# Patient Record
Sex: FEMALE | Race: WHITE | NOT HISPANIC OR LATINO | Employment: FULL TIME | URBAN - METROPOLITAN AREA
[De-identification: names, ages, dates, MRNs, and addresses within clinical notes are randomized per-mention and may not be internally consistent; named-entity substitution may affect disease eponyms.]

---

## 2021-09-14 ENCOUNTER — OFFICE VISIT (OUTPATIENT)
Dept: URGENT CARE | Facility: CLINIC | Age: 26
End: 2021-09-14
Payer: COMMERCIAL

## 2021-09-14 ENCOUNTER — APPOINTMENT (OUTPATIENT)
Dept: RADIOLOGY | Facility: IMAGING CENTER | Age: 26
End: 2021-09-14
Attending: PHYSICIAN ASSISTANT
Payer: COMMERCIAL

## 2021-09-14 VITALS
BODY MASS INDEX: 34.42 KG/M2 | SYSTOLIC BLOOD PRESSURE: 106 MMHG | DIASTOLIC BLOOD PRESSURE: 60 MMHG | RESPIRATION RATE: 16 BRPM | TEMPERATURE: 98.1 F | WEIGHT: 232.4 LBS | HEIGHT: 69 IN | HEART RATE: 85 BPM | OXYGEN SATURATION: 98 %

## 2021-09-14 DIAGNOSIS — S99.911A INJURY OF RIGHT ANKLE, INITIAL ENCOUNTER: ICD-10-CM

## 2021-09-14 DIAGNOSIS — M89.9 LESION OF BONE OF RIGHT LOWER LEG: ICD-10-CM

## 2021-09-14 PROCEDURE — 73610 X-RAY EXAM OF ANKLE: CPT | Mod: TC,RT | Performed by: PHYSICIAN ASSISTANT

## 2021-09-14 PROCEDURE — 99204 OFFICE O/P NEW MOD 45 MIN: CPT | Performed by: PHYSICIAN ASSISTANT

## 2021-09-14 RX ORDER — LEVETIRACETAM 500 MG/1
500 TABLET ORAL 2 TIMES DAILY
COMMUNITY

## 2021-09-14 NOTE — LETTER
September 14, 2021         Patient: Liliana Jimenez   YOB: 1995   Date of Visit: 9/14/2021           To Whom it May Concern:    Liliana Jimenez was seen in my clinic on 9/14/2021. Please excuse any absences this week due to ankle injury.    If you have any questions or concerns, please don't hesitate to call.        Sincerely,           Hussein Hobbs P.A.-C.  Electronically Signed

## 2021-09-15 ENCOUNTER — TELEPHONE (OUTPATIENT)
Dept: URGENT CARE | Facility: CLINIC | Age: 26
End: 2021-09-15

## 2021-09-15 NOTE — PROGRESS NOTES
"Subjective     Liliana Jimenez is a 25 y.o. female who presents with Ankle Injury (fell and twisted ankle ( R ) x 3 days )            Ankle Injury   The incident occurred 3 to 5 days ago. The incident occurred at home. The injury mechanism was an inversion injury. The pain is present in the right ankle. Pertinent negatives include no loss of motion, loss of sensation, muscle weakness, numbness or tingling. The symptoms are aggravated by movement, palpation and weight bearing. She has tried ice for the symptoms. The treatment provided mild relief.       PMH:  has no past medical history on file.  MEDS:   Current Outpatient Medications:   •  levETIRAcetam (KEPPRA) 500 MG Tab, Take 500 mg by mouth 2 times a day., Disp: , Rfl:   ALLERGIES: No Known Allergies  SURGHX: History reviewed. No pertinent surgical history.  SOCHX:    FH: family history is not on file.    Review of Systems   Constitutional: Negative.    Respiratory: Negative.    Cardiovascular: Negative.    Musculoskeletal: Positive for joint pain. Negative for falls.   Neurological: Negative.  Negative for tingling and numbness.   All other systems reviewed and are negative.      Medications, Allergies, and current problem list reviewed today in Epic           Objective     /60   Pulse 85   Temp 36.7 °C (98.1 °F) (Temporal)   Resp 16   Ht 1.753 m (5' 9\")   Wt 105 kg (232 lb 6.4 oz)   SpO2 98%   BMI 34.32 kg/m²      Physical Exam  Vitals and nursing note reviewed.   Constitutional:       General: She is not in acute distress.     Appearance: She is well-developed. She is not diaphoretic.   HENT:      Head: Normocephalic and atraumatic.   Eyes:      Conjunctiva/sclera: Conjunctivae normal.   Cardiovascular:      Rate and Rhythm: Normal rate and regular rhythm.      Heart sounds: Normal heart sounds.   Pulmonary:      Effort: Pulmonary effort is normal. No respiratory distress.      Breath sounds: Normal breath sounds. No wheezing.   Musculoskeletal: "      Cervical back: Normal range of motion and neck supple.      Right ankle: Swelling present. Tenderness present over the lateral malleolus and ATF ligament. No base of 5th metatarsal or proximal fibula tenderness. Decreased range of motion.      Right Achilles Tendon: Normal.        Feet:    Skin:     General: Skin is warm and dry.   Neurological:      Mental Status: She is alert and oriented to person, place, and time.   Psychiatric:         Behavior: Behavior normal.         Thought Content: Thought content normal.         Judgment: Judgment normal.                     Assessment & Plan     9/14/2021 10:41 PM     HISTORY/REASON FOR EXAM: Pain/Deformity Following Trauma     TECHNIQUE/EXAM DESCRIPTION:  AP, lateral, and oblique views of the 3 ankle.     COMPARISON:  None     FINDINGS:     There is no visualized fracture, subluxation, or dislocation. Lobulated cortically based sclerotic lesion is seen in the distal right tibial diaphysis. Soft tissue swelling of the lateral ankle is seen.     IMPRESSION:        1.  No acute traumatic bony injury.  2.  Lobulated cortically based sclerotic lesion in the distal right tibial diaphysis. Follow-up with contrast-enhanced MRI for further characterization recommended.     1. Injury of right ankle, initial encounter  DX-ANKLE 3+ VIEWS RIGHT   2. Lesion of bone of right lower leg  FX-[id number]-WITH & W/O RIGHT    REFERRAL TO ORTHOPEDICS     Ankle sprain right ankle.  X-ray negative and symptoms improving.  On the x-ray there was an incidental finding concerning for a sclerotic bone lesion.  A MRI was ordered to be completed emergently.  However due to patient's insurance she cannot obtain this test at [facility].  She was given all the proper information to schedule with [facility].  I will refer to proper channels pending MRI results.    Update: 9/26/2021 10:30 PM  Have left 3 messages personally and my assistant has left multiple voicemails.  Unable to reach  patient to determine if she has scheduled and completed ordered MRI.  Due to this uncertainty I have placed a referral to orthopedics in the hopes that she can have continuity of care.    Return to clinic or go to ED if symptoms worsen or persist. Indications for ED discussed at length. Patient/Parent/Guardian voices understanding. Follow-up with your primary care provider in 3-5 days. Red flag symptoms discussed. All side effects of medication discussed including allergic response, GI upset, tendon injury, rash, sedation etc.    Please note that this dictation was created using voice recognition software. I have made every reasonable attempt to correct obvious errors, but I expect that there are errors of grammar and possibly content that I did not discover before finalizing the note.

## 2021-09-15 NOTE — TELEPHONE ENCOUNTER
Dillon Savage, I tried calling in the MR- TIBIA- FIBULA- WITH & W/O RIGHT, but unfortunately I was not able to because of the patient's insurance. Insurance will not cover it.

## 2021-09-26 ASSESSMENT — ENCOUNTER SYMPTOMS
LOSS OF SENSATION: 0
MUSCLE WEAKNESS: 0
NEUROLOGICAL NEGATIVE: 1
FALLS: 0
NUMBNESS: 0
TINGLING: 0
RESPIRATORY NEGATIVE: 1
CONSTITUTIONAL NEGATIVE: 1
LOSS OF MOTION: 0
CARDIOVASCULAR NEGATIVE: 1

## 2021-10-24 ENCOUNTER — HOSPITAL ENCOUNTER (EMERGENCY)
Facility: MEDICAL CENTER | Age: 26
End: 2021-10-25
Attending: EMERGENCY MEDICINE | Admitting: EMERGENCY MEDICINE
Payer: COMMERCIAL

## 2021-10-24 ENCOUNTER — APPOINTMENT (OUTPATIENT)
Dept: RADIOLOGY | Facility: MEDICAL CENTER | Age: 26
End: 2021-10-24
Attending: EMERGENCY MEDICINE
Payer: COMMERCIAL

## 2021-10-24 ENCOUNTER — OCCUPATIONAL MEDICINE (OUTPATIENT)
Dept: URGENT CARE | Facility: CLINIC | Age: 26
End: 2021-10-24
Payer: COMMERCIAL

## 2021-10-24 VITALS
DIASTOLIC BLOOD PRESSURE: 78 MMHG | SYSTOLIC BLOOD PRESSURE: 110 MMHG | BODY MASS INDEX: 33.47 KG/M2 | WEIGHT: 226 LBS | RESPIRATION RATE: 14 BRPM | HEIGHT: 69 IN | HEART RATE: 53 BPM | OXYGEN SATURATION: 99 % | TEMPERATURE: 97.4 F

## 2021-10-24 DIAGNOSIS — S60.221A CONTUSION OF RIGHT HAND, INITIAL ENCOUNTER: ICD-10-CM

## 2021-10-24 DIAGNOSIS — S67.21XA CRUSHING INJURY OF RIGHT HAND, INITIAL ENCOUNTER: ICD-10-CM

## 2021-10-24 PROCEDURE — 73130 X-RAY EXAM OF HAND: CPT | Mod: RT

## 2021-10-24 PROCEDURE — 99213 OFFICE O/P EST LOW 20 MIN: CPT | Performed by: FAMILY MEDICINE

## 2021-10-24 PROCEDURE — 99283 EMERGENCY DEPT VISIT LOW MDM: CPT

## 2021-10-24 ASSESSMENT — PAIN DESCRIPTION - DESCRIPTORS: DESCRIPTORS: ACHING

## 2021-10-24 NOTE — LETTER
[facility], EMERGENCY DEPT   [address] [place] [address]  Phone: Dept: [phone] - Fax:        [facility] Progress Report and Disability Certification  Date of Service: 10/24/2021   No Show:  No  Date / Time of Next Visit: 11/1/2021   Claim Information   Patient Name: Liliana Jimenez  Claim Number:  [id number]   Employer: [employer]  Date of Injury: 10/18/2021     Insurer / TPA: [employer] ID / SSN:    Occupation:  Diagnosis: The encounter diagnosis was Contusion of right hand, initial encounter.    Medical Information   Related to Industrial Injury? Yes    Subjective Complaints:  Right hand swelling and pain   Objective Findings: Contusion, his second third and fourth distal metacarpals   Pre-Existing Condition(s): none   Assessment:   Condition Same    Status: Additional Care Required  Permanent Disability:  Comments:unlikely    Plan: Medication    Diagnostics: X-ray    Comments:       Disability Information   Status: Released to Restricted Duty    From:  10/25/2021  Through: 11/1/2021 Restrictions are: Temporary   Physical Restrictions   Sitting:    Standing:    Stooping:    Bending:      Squatting:    Walking:    Climbing:    Pushing:      Pulling:    Other:    Reaching Above Shoulder (L):   Reaching Above Shoulder (R):       Reaching Below Shoulder (L):    Reaching Below Shoulder (R):      Not to exceed Weight Limits   Carrying(hrs):   Weight Limit(lb):   Lifting(hrs):   Weight  Limit(lb):     Comments: Limited use of right hand no lifting, pushing, pulling greater than 5 pounds.  Computer work/typing is okay     Repetitive Actions   Hands: i.e. Fine Manipulations from Grasping:     Feet: i.e. Operating Foot Controls:     Driving / Operate Machinery:     Physician Name: Patrick Kwan Physician Signature: PATRICK Garcia M.D. e-Signature:  , Medical Director   Clinic Name / Location: [facility]  [facility]  [facility], EMERGENCY DEPT  [address]  [place] [address]  [phone]     Clinic Phone Number: Dept: [phone]   Appointment Time:  Visit Start Time:    Check-In Time:  10:34 PM Visit Discharge Time:    Original-Treating Physician or Chiropractor    Page 2-Insurer/TPA    Page 3-Employer    Page 4-Employee

## 2021-10-24 NOTE — LETTER
[facility] [place] [address] [facility] [address] [place] [address]  Phone:  [phone] - Fax:  [phone]   [facility] Progress Report and Disability Certification  Date of Service: 10/24/2021   No Show:  No  Date / Time of Next Visit:     Claim Information   Patient Name: Liliana Jimenez  Claim Number:     Employer: [employer]  Date of Injury: 10/18/2021     Insurer / TPA: [employer]  ID / SSN:     Occupation:   Diagnosis: The encounter diagnosis was Crushing injury of right hand, initial encounter.    Medical Information   Related to Industrial Injury? Yes    Subjective Complaints:  DOI: 10/18    Rt hand smashed between battery pack and cart at work on 10/18.   She reports pain and swelling are not improving.        She continues to c/o  constant,   but throbbing rt hand pain, worse when she tries to use the hand.       The pain does not radiate.   Pertinent negatives include no chest pain, muscle weakness, numbness or tingling. The symptoms are aggravated by movement and palpation. Pt has tried motrin for the symptoms.      Objective Findings:      Rt hand :        Left hand: Normal sensation noted. Normal strength noted.  + TTP over 3rd/4th MCP.  normal range of motion and no crepitus.   Cap refill < 2 s.    strength 4/5   Pre-Existing Condition(s):     Assessment:   Initial Visit    Status: Discharged / Care Transfer  Permanent Disability:     Plan:      Diagnostics:      Comments:       Disability Information   Status:      From:     Through:   Restrictions are:   Comments:pending, per emergency room provider   Physical Restrictions   Sitting:    Standing:    Stooping:    Bending:      Squatting:    Walking:    Climbing:    Pushing:      Pulling:    Other:    Reaching Above Shoulder (L):   Reaching Above Shoulder (R):       Reaching Below Shoulder (L):    Reaching Below Shoulder (R):      Not to exceed Weight Limits   Carrying(hrs):   Weight  Limit(lb):   Lifting(hrs):   Weight  Limit(lb):     Comments:   1. Crushing injury of right hand, initial encounter      Concern for fracture, and I do not have access to x-ray at this hour of the night.       Will transfer to [facility] ED for further evaluation and treatment.       Work restrictions pending per ERP         Repetitive Actions   Hands: i.e. Fine Manipulations from Grasping:     Feet: i.e. Operating Foot Controls:     Driving / Operate Machinery:     Health Care Provider’s Original or Electronic Signature  Jesse Bennett M.D. Health Care Provider’s Original or Electronic Signature    Garrison Hall MD         Clinic Name / Location: [address] [place] [address] Clinic Phone Number: Dept: [phone]   Appointment Time: 8:30 Pm Visit Start Time: 9:07 PM   Check-In Time:  8:32 Pm Visit Discharge Time: 10:13pm   Original-Treating Physician or Chiropractor    Page 2-Insurer/TPA    Page 3-Employer    Page 4-Employee

## 2021-10-24 NOTE — LETTER
[facility], EMERGENCY DEPT   [address] [place] [address]  Phone: Dept: [phone] - Fax:        [facility] Progress Report and Disability Certification  Date of Service: 10/24/2021   No Show:  No  Date / Time of Next Visit: 11/1/2021   Claim Information   Patient Name: Liliana Jimenez  Claim Number:     Employer: [employer]  Date of Injury:      Insurer / TPA: [employer] ID / SSN: [id number]    Occupation:  Diagnosis: The encounter diagnosis was Contusion of right hand, initial encounter.    Medical Information   Related to Industrial Injury? Yes ***   Subjective Complaints:  Right hand swelling and pain   Objective Findings: Contusion, his second third and fourth distal metacarpals   Pre-Existing Condition(s): none   Assessment:   Condition Same    Status: Additional Care Required  Permanent Disability:  Comments:unlikely    Plan: Medication    Diagnostics: X-ray    Comments:       Disability Information   Status: Released to Restricted Duty    From:  10/25/2021  Through: 11/1/2021 Restrictions are: Temporary   Physical Restrictions   Sitting:    Standing:    Stooping:    Bending:      Squatting:    Walking:    Climbing:    Pushing:      Pulling:    Other:    Reaching Above Shoulder (L):   Reaching Above Shoulder (R):       Reaching Below Shoulder (L):    Reaching Below Shoulder (R):      Not to exceed Weight Limits   Carrying(hrs):   Weight Limit(lb):   Lifting(hrs):   Weight  Limit(lb):     Comments: Limited use of right hand no lifting, pushing, pulling greater than 5 pounds.  Computer work/typing is okay     Repetitive Actions   Hands: i.e. Fine Manipulations from Grasping:     Feet: i.e. Operating Foot Controls:     Driving / Operate Machinery:     Physician Name: Patrick Kwan Physician Signature: PATRICK Garcia M.D. e-Signature:  , Medical Director   Clinic Name / Location: [facility]  [facility], EMERGENCY DEPT  [address]  [place] [address]  [phone]     Clinic Phone Number: Dept: [phone]   Appointment Time:  Visit Start Time:    Check-In Time:  10:34 PM Visit Discharge Time:    Original-Treating Physician or Chiropractor    Page 2-Insurer/TPA    Page 3-Employer    Page 4-Employee

## 2021-10-24 NOTE — LETTER
"EMPLOYEE’S CLAIM FOR COMPENSATION/ REPORT OF INITIAL TREATMENT  FORM C-4    EMPLOYEE’S CLAIM - PROVIDE ALL INFORMATION REQUESTED   First Name  Liliana Last Name  Tony Birthdate                    1995                Sex  female Claim Number (Insurer’s Use Only)    Home Address  [address]  [address] Age  25 y.o. Height  1.753 m (5' 9\") Weight  103 kg (226 lb) [facility]     [facility] Zip  [address] Telephone  [phone] (home)    Mailing Address  [address]  [address] [facility] Zip  [address] Primary Language Spoken  English    Insurer   Third-Party   [employer]   Employee's Occupation (Job Title) When Injury or Occupational Disease Occurred      Employer's Name/Company Name  [employer]  Telephone  [phone]    Office Mail Address (Number and Street)   [address]  [place]  Zip  [address]    Date of Injury  10/18/2021               Hours Injury  8:00 PM Date Employer Notified  10/18/2021 Last Day of Work after Injury     or Occupational Disease  10/20/2021 Supervisor to Whom Injury     Reported  Markie Sullivan   Address or Location of Accident (if applicable)  [[address]   What were you doing at the time of accident? (if applicable)  Working    How did this injury or occupational disease occur? (Be specific an answer in detail. Use additional sheet if necessary)  Training in a specific area with a new person. We were pushing a battery pack onto a conveyor. Told the new karmen to wait so I could remove red stopper. New karmen didn't hear me & continued pushing pack onto my hand   If you believe that you have an occupational disease, when did you first have knowledge of the disability and it relationship to your employment?  N/A Witnesses to the Accident  N/A- I don't remember the new demond name      Nature of Injury or Occupational " Disease  Crushing  Part(s) of Body Injured or Affected  Hand (R), ,     I certify that the above is true and correct to the best of my knowledge and that I have provided this information in order to obtain the benefits of [place] of [place]).  I hereby authorize any physician, chiropractor, surgeon, practitioner, or other person, any hospital, including [facility] or [facility] hospital, any medical service organization, any insurance company, or other institution or organization to release to each other, any medical or other information, including benefits paid or payable, pertinent to this injury or disease, except information relative to diagnosis, treatment and/or counseling for AIDS, psychological conditions, alcohol or controlled substances, for which I must give specific authorization.  A Photostat of this authorization shall be as valid as the original.     Date   Place Employee’s Original or  *Electronic Signature   THIS REPORT MUST BE COMPLETED AND MAILED WITHIN 3 WORKING DAYS OF TREATMENT   Place  [facility]  Name of Facility  [facility]   Date  10/24/2021 Diagnosis and Description of Injury or Occupational Disease  (S67.21XA) Crushing injury of right hand, initial encounter Is there evidence the injured employee was under the influence of alcohol and/or another controlled substance at the time of accident?  ? No ? Yes (if yes, please explain)    Hour  9:07 PM   The encounter diagnosis was Crushing injury of right hand, initial encounter.     Treatment    1. Crushing injury of right hand, initial encounter      Concern for fracture, and I do not have access to x-ray at this hour of the night.       Will transfer to [facility] ED for further evaluation and treatment.       Work restrictions pending per ERP       Have you advised the patient to remain off work five days or     more?  "   X-Ray Findings      ? Yes Indicate dates:   From   To      From information given by the employee, together with medical evidence, can        you directly connect this injury or occupational disease as job incurred?  Yes ? No If no, is the injured employee capable of:  ? full duty  No ? modified duty  Yes   Is additional medical care by a physician indicated?  Yes If Modified Duty, Specify any Limitations / Restrictions  Work restrictions pending per ERP     Do you know of any previous injury or disease contributing to this condition or occupational disease?  ? Yes ? No (Explain if yes)                          No   Date  10/24/2021 Print Health Care Provider's   Jesse Bennett M.D. I certify the employer’s copy of  this form was mailed on:   Address  [address]     [place] Zip  [address]    Provider’s Tax ID Number  [id number] Telephone  Dept: [phone]             Health Care Provider’s Original or Electronic Signature  e-JESSE Brady M.D. Degree (MD,DO, DC,PA-C,APRN)         * Complete and attach Release of Information (Form C-4A) when injured employee signs C-4 Form electronically  ORIGINAL - TREATING HEALTHCARE PROVIDER PAGE 2 - INSURER/TPA PAGE 3 - EMPLOYER PAGE 4 - EMPLOYEE             Form C-4 (rev.08/21)           BRIEF DESCRIPTION OF RIGHTS AND BENEFITS  (Pursuant to [place] 616C.050)    Notice of Injury or Occupational Disease (Incident Report Form C-1): If an injury or occupational disease (OD) arises out of and in the course of employment, you must provide written notice to your employer as soon as practicable, but no later than 7 days after the accident or OD. Your employer shall maintain a sufficient supply of the required forms.    Claim for Compensation (Form C-4): If medical treatment is sought, the form C-4 is available at the place of initial treatment. A completed \"Claim for Compensation\" (Form C-4) must be filed within 90 days after an " accident or OD. The treating physician or chiropractor must, within 3 working days after treatment, complete and mail to the employer, the employer's insurer and third-party , the Claim for Compensation.    Medical Treatment: If you require medical treatment for your on-the-job injury or OD, you may be required to select a physician or chiropractor from a list provided by your workers’ compensation insurer, if it has contracted with an Organization for Managed Care (MCO) or Preferred Provider Organization (PPO) or providers of health care. If your employer has not entered into a contract with an MCO or PPO, you may select a physician or chiropractor from the Panel of Physicians and Chiropractors. Any medical costs related to your industrial injury or OD will be paid by your insurer.    Temporary Total Disability (TTD): If your doctor has certified that you are unable to work for a period of at least 5 consecutive days, or 5 cumulative days in a 20-day period, or places restrictions on you that your employer does not accommodate, you may be entitled to TTD compensation.    Temporary Partial Disability (TPD): If the wage you receive upon reemployment is less than the compensation for TTD to which you are entitled, the insurer may be required to pay you TPD compensation to make up the difference. TPD can only be paid for a maximum of 24 months.    Permanent Partial Disability (PPD): When your medical condition is stable and there is an indication of a PPD as a result of your injury or OD, within 30 days, your insurer must arrange for an evaluation by a rating physician or chiropractor to determine the degree of your PPD. The amount of your PPD award depends on the date of injury, the results of the PPD evaluation, your age and wage.    Permanent Total Disability (PTD): If you are medically certified by a treating physician or chiropractor as permanently and totally disabled and have been granted a PTD  status by your insurer, you are entitled to receive monthly benefits not to exceed 66 2/3% of your average monthly wage. The amount of your PTD payments is subject to reduction if you previously received a lump-sum PPD award.    Vocational Rehabilitation Services: You may be eligible for vocational rehabilitation services if you are unable to return to the job due to a permanent physical impairment or permanent restrictions as a result of your injury or occupational disease.    Transportation and Per Daryn Reimbursement: You may be eligible for travel expenses and per daryn associated with medical treatment.    Reopening: You may be able to reopen your claim if your condition worsens after claim closure.     Appeal Process: If you disagree with a written determination issued by the insurer or the insurer does not respond to your request, you may appeal to the Department of Administration, , by following the instructions contained in your determination letter. You must appeal the determination within 70 days from the date of the determination letter at [address], [address], [place], [place] [address], or [address], [address], [place] [address]. If you disagree with the  decision, you may appeal to the Department of Administration, . You must file your appeal within 30 days from the date of the  decision letter at [address], [address], [place], [place] [address], or [address], [address], [place] [address]. If you disagree with a decision of an , you may file a petition for judicial review with the District Court. You must do so within 30 days of the Appeal Officer’s decision. You may be represented by an  at your own expense or you may contact the [facility] for possible representation.    [place]  for Injured Workers (NAIW): If you disagree with a  decision, you  may request that [facility] represent you without charge at an  Hearing. For information regarding denial of benefits, you may contact the [facility] at: [address], [address], [place] [address], [phone], or [address], [address], [place] [address], [phone]    To File a Complaint with the Division: If you wish to file a complaint with the  of the Division of Industrial Relations (DIR),  please contact the Workers’ Compensation Section, [address], [address], [place], [place] [address], telephone [phone]) [phone], or [address], [address], [place], [place] [address], telephone [phone].    For assistance with Workers’ Compensation Issues: You may contact the [facility] Office for Consumer Health Assistance, [address], [address], [place], [place] [address], Toll Free 1-275.921.1359, Web site: http://[facility].[place].gov/Programs/MEETA E-mail: [email or web address]              __________________________________________________________________                                    _________________            Employee Name / Signature                                                                                                                            Date                                                                                                                                                                                                                              D-2 (rev. 10/20)

## 2021-10-25 VITALS
HEIGHT: 69 IN | BODY MASS INDEX: 33.63 KG/M2 | DIASTOLIC BLOOD PRESSURE: 69 MMHG | SYSTOLIC BLOOD PRESSURE: 117 MMHG | TEMPERATURE: 97.6 F | OXYGEN SATURATION: 99 % | HEART RATE: 56 BPM | WEIGHT: 227.07 LBS | RESPIRATION RATE: 14 BRPM

## 2021-10-25 NOTE — ED PROVIDER NOTES
"ED Provider Note    CHIEF COMPLAINT  Chief Complaint   Patient presents with   • Hand Pain       HPI  Liliana Jimenez is a 25 y.o. female who presents for evaluation of right hand pain after a crush injury at work getting it stuck between a battery and a cart.  Incident was about a week ago and she followed up with urgent care due to continued pain and swelling.  Urgent care was noted around the third and fourth metacarpals distally as well as those same MP joints    REVIEW OF SYSTEMS  Skin: No rashes, abrasions, lacerations, or bruising  Musculoskeletal: No recent trauma, pain, swelling, weakness  Neurologic: No sensory or motor changes.  Heme: No bleeding or bruising problems.   Immuno: No hx of recurrent infections    PAST FAM HISTORY  No family history on file.    PAST MEDICAL HISTORY  seizures    SOCIAL HISTORY  Social History     Tobacco Use   • Smoking status: Never Smoker   • Smokeless tobacco: Never Used   Substance and Sexual Activity   • Alcohol use: Never   • Drug use: Yes     Types: Marijuana   • Sexual activity: Not on file       SURGICAL HISTORY  patient denies any surgical history    CURRENT MEDICATIONS  Home Medications     Reviewed by Lizet Griggs R.N. (Registered Nurse) on 10/24/21 at 2243  Med List Status: Partial   Medication Last Dose Status   levETIRAcetam (KEPPRA) 500 MG Tab  Active                ALLERGIES  No Known Allergies    PHYSICAL EXAM  VITAL SIGNS: /69   Pulse (!) 56   Temp 36.4 °C (97.6 °F) (Temporal)   Resp 14   Ht 1.753 m (5' 9\")   Wt 103 kg (227 lb 1.2 oz)   LMP 10/10/2021 (Exact Date)   SpO2 99%   BMI 33.53 kg/m²    Gen: Alert in no apparent distress.  Cardiovascular: Regular rate and rhythm as measured by the radial artery on affected limb.  2+ radial artery pulse.  Capillary refill less than 3 seconds to affected extremity.    Skin: Warm, Dry, No erythema, No rash or significant ecchymosis noted to affected extremity   Extremities: Intact distal pulses, mild " edema with tenderness to palpation over the distal second, third and fourth metacarpal and MP joints.  There is no tenderness at the wrist or at the snuffbox.   Neurologic: Sensation intact light touch all fingers and thumb on affected extremity.  Patient able to make a fist and extend fingers with decent range of motion and good strength.      RADIOLOGY  DX-HAND 3+ RIGHT   Final Result         1.  No acute traumatic bony injury.            COURSE & MEDICAL DECISION MAKING  Patient was for evaluation of an injury that occurred a week ago at work none does not appear to be associated with any bony abnormalities by plain films.  Functionally, neurologically, and vascularly, the hand appears intact and there is no snuffbox tenderness to argue for further imaging of the wrist.  Patient will follow up with occupational health if symptoms persist and will be placed on light duty for the next week.  She will use a wrist splint of the Velcro type as needed.    FINAL IMPRESSION  1. Contusion of right hand, initial encounter        Electronically signed by: Patrick Kwan M.D., 10/24/2021 10:56 PM

## 2021-10-25 NOTE — ED TRIAGE NOTES
Patient presents with complaints of R hand pain after crushing it at work 1 week ago. Was seen at her onsite clinic for her employer but no Xrays were done; was advised to come into ED should the hand not improved. Some swelling and bruising noted to R hand; able to move fingers. Has been using ibuprofen for the pain with moderate relief.

## 2021-10-25 NOTE — PROGRESS NOTES
"Chief Complaint   Patient presents with   • Work-Related Injury      DOI: 10/18/21 (R) hand, crushed, swelling      Subjective:      Chief Complaint   Patient presents with   • Work-Related Injury      DOI: 10/18/21 (R) hand, crushed, swelling               hand Injury          DOI: 10/18    Rt hand smashed between battery pack and cart at work on 10/18.   She reports pain and swelling are not improving.        She continues to c/o  constant,   but throbbing rt hand pain, worse when she tries to use the hand.       The pain does not radiate.   Pertinent negatives include no chest pain, muscle weakness, numbness or tingling. The symptoms are aggravated by movement and palpation. Pt has tried motrin for the symptoms.     Past medical history was unremarkable and not pertinent to current issue  Social hx - denies tobacco, alcohol, drug use  Family hx was reviewed - no pertinent past family hx      Review of Systems   Constitutional: Negative for fever.   Respiratory: Negative for shortness of breath.    Cardiovascular: Negative for chest pain.   Neurological: Negative for tingling and numbness.   10 point ROS otherwise negative, except per HPI           Objective:     /78   Pulse (!) 53   Temp 36.3 °C (97.4 °F)   Resp 14   Ht 1.753 m (5' 9\")   Wt 103 kg (226 lb)   SpO2 99%       Physical Exam   Constitutional: pt is oriented to person, place, and time. Pt appears well-developed and well-nourished. No distress.   HENT:   Head: Normocephalic and atraumatic.   Eyes: Conjunctivae are normal.   Cardiovascular: Normal rate.    Pulmonary/Chest: Effort normal.   Musculoskeletal:        Rt hand :        Left hand: Normal sensation noted. Normal strength noted.  + TTP over 3rd/4th MCP.  normal range of motion and no crepitus.   Cap refill < 2 s.    strength 4/5    Neurological: pt is alert and oriented to person, place, and time.   Skin: Skin is warm. Pt is not diaphoretic. No erythema.   Nursing note and " vitals reviewed.              Assessment/Plan:         1. Crushing injury of right hand, initial encounter      Concern for fracture, and I do not have access to x-ray at this hour of the night.       Will transfer to [facility] ED for further evaluation and treatment.       Work restrictions pending per ERP      Report called to transfer line.

## 2023-03-27 ENCOUNTER — APPOINTMENT (OUTPATIENT)
Dept: URGENT CARE | Facility: PHYSICIAN GROUP | Age: 28
End: 2023-03-27
Payer: COMMERCIAL

## 2023-03-28 ENCOUNTER — HOSPITAL ENCOUNTER (OUTPATIENT)
Dept: RADIOLOGY | Facility: MEDICAL CENTER | Age: 28
End: 2023-03-28
Attending: PHYSICIAN ASSISTANT
Payer: COMMERCIAL

## 2023-03-28 ENCOUNTER — OFFICE VISIT (OUTPATIENT)
Dept: URGENT CARE | Facility: PHYSICIAN GROUP | Age: 28
End: 2023-03-28
Payer: COMMERCIAL

## 2023-03-28 VITALS
TEMPERATURE: 98.2 F | HEIGHT: 69 IN | WEIGHT: 210 LBS | DIASTOLIC BLOOD PRESSURE: 70 MMHG | HEART RATE: 93 BPM | RESPIRATION RATE: 14 BRPM | OXYGEN SATURATION: 97 % | SYSTOLIC BLOOD PRESSURE: 108 MMHG | BODY MASS INDEX: 31.1 KG/M2

## 2023-03-28 DIAGNOSIS — S89.92XA INJURY OF LEFT KNEE, INITIAL ENCOUNTER: ICD-10-CM

## 2023-03-28 DIAGNOSIS — S80.02XA CONTUSION OF LEFT KNEE, INITIAL ENCOUNTER: ICD-10-CM

## 2023-03-28 PROCEDURE — 99214 OFFICE O/P EST MOD 30 MIN: CPT | Performed by: PHYSICIAN ASSISTANT

## 2023-03-28 PROCEDURE — 73564 X-RAY EXAM KNEE 4 OR MORE: CPT | Mod: LT

## 2023-03-28 ASSESSMENT — ENCOUNTER SYMPTOMS
RESPIRATORY NEGATIVE: 1
NEUROLOGICAL NEGATIVE: 1
CARDIOVASCULAR NEGATIVE: 1
FALLS: 1

## 2023-03-28 ASSESSMENT — FIBROSIS 4 INDEX: FIB4 SCORE: 0.33

## 2023-03-28 NOTE — PROGRESS NOTES
"Subjective     Liliana Jimenez is a very pleasant 27 y.o. female who presents with Fall (Fell on L knee x2 days, slight bruising. Fainted and landed on knee. )            Fall  The accident occurred 2 days ago. The fall occurred while standing. She fell from a height of 3 to 5 ft. She landed on Hard floor. There was no blood loss. The point of impact was the left knee. The pain is present in the left knee. The symptoms are aggravated by ambulation, pressure on injury and use of injured limb. She has tried ice and NSAID for the symptoms. The treatment provided mild relief.       PMH:  has no past medical history on file.  MEDS:   Current Outpatient Medications:     levETIRAcetam (KEPPRA) 500 MG Tab, Take 500 mg by mouth 2 times a day., Disp: , Rfl:   ALLERGIES: No Known Allergies  SURGHX: No past surgical history on file.  SOCHX:  reports that she has never smoked. She has never used smokeless tobacco. She reports current alcohol use. She reports current drug use. Drug: Marijuana.  FH: family history is not on file.    Review of Systems   Respiratory: Negative.     Cardiovascular: Negative.    Musculoskeletal:  Positive for falls and joint pain.   Neurological: Negative.         Medications, Allergies, and current problem list reviewed today in Epic      Objective     /70   Pulse 93   Temp 36.8 °C (98.2 °F)   Resp 14   Ht 1.753 m (5' 9\")   Wt 95.3 kg (210 lb)   SpO2 97%   BMI 31.01 kg/m²      Physical Exam  Vitals and nursing note reviewed.   Constitutional:       General: She is not in acute distress.     Appearance: Normal appearance. She is well-developed. She is not ill-appearing, toxic-appearing or diaphoretic.   HENT:      Head: Normocephalic and atraumatic.      Right Ear: Hearing and external ear normal.      Left Ear: Hearing and external ear normal.      Nose: Nose normal.      Mouth/Throat:      Dentition: Normal dentition. No dental caries.   Eyes:      General:         Right eye: No " discharge.         Left eye: No discharge.      Conjunctiva/sclera: Conjunctivae normal.   Neck:      Thyroid: No thyromegaly.      Trachea: No tracheal deviation.   Cardiovascular:      Rate and Rhythm: Normal rate and regular rhythm.      Heart sounds: Normal heart sounds.   Pulmonary:      Effort: Pulmonary effort is normal. No respiratory distress.      Breath sounds: Normal breath sounds. No wheezing.   Abdominal:      General: Abdomen is flat.      Palpations: Abdomen is soft.   Musculoskeletal:      Cervical back: Normal range of motion and neck supple.      Left knee: Swelling, ecchymosis and bony tenderness present. No crepitus. Decreased range of motion. Normal alignment, normal meniscus and normal patellar mobility.   Skin:     General: Skin is warm and dry.      Nails: There is no clubbing.   Neurological:      General: No focal deficit present.      Mental Status: She is alert and oriented to person, place, and time.   Psychiatric:         Mood and Affect: Mood normal.         Behavior: Behavior normal.         Thought Content: Thought content normal.         Judgment: Judgment normal.                           Assessment & Plan     This is a very pleasant 27-year-old female who fell directly onto her left knee 3 days ago.  She has pain swelling and bruising of the left kneecap.  No previous injuries to this knee.  No numbness tingling or malalignment.  Exam shows TTP with swelling and ecchymosis.  Knee is stable with no crepitus.  X-ray negative.  Patient will be treated for an acute patella contusion.  RICE therapy.  Note for work.    1. Injury of left knee, initial encounter  DX-KNEE COMPLETE 4+ LEFT      2. Contusion of left knee, initial encounter            Return to clinic or go to ED if symptoms worsen or persist. Red flag symptoms and indications for ED discussed at length. Patient/Parent/Guardian voices understanding. Follow-up with your primary care provider in 3-5 days. All side effects and  potential interactions of prescribed medication discussed including allergic response, GI upset, tendon injury, rash, sedation etc.    Please note that this dictation was created using voice recognition software. I have made every reasonable attempt to correct obvious errors, but I expect that there are errors of grammar and possibly content that I did not discover before finalizing the note.

## 2023-03-28 NOTE — LETTER
March 28, 2023         Patient: Liliana Jimenez   YOB: 1995   Date of Visit: 3/28/2023           To Whom it May Concern:    Liliana Jimenez was seen in my clinic on 3/28/2023. She may return to work on Sunday April 2nd.    If you have any questions or concerns, please don't hesitate to call.        Sincerely,           Hussein Hobbs P.A.-C.  Electronically Signed

## 2023-04-11 ENCOUNTER — OFFICE VISIT (OUTPATIENT)
Dept: URGENT CARE | Facility: CLINIC | Age: 28
End: 2023-04-11
Payer: COMMERCIAL

## 2023-04-11 VITALS
SYSTOLIC BLOOD PRESSURE: 110 MMHG | TEMPERATURE: 98.5 F | BODY MASS INDEX: 31.1 KG/M2 | OXYGEN SATURATION: 96 % | RESPIRATION RATE: 16 BRPM | HEIGHT: 69 IN | DIASTOLIC BLOOD PRESSURE: 76 MMHG | HEART RATE: 101 BPM | WEIGHT: 210 LBS

## 2023-04-11 DIAGNOSIS — M25.562 ACUTE PAIN OF LEFT KNEE: ICD-10-CM

## 2023-04-11 DIAGNOSIS — S83.92XA SPRAIN OF LEFT KNEE, UNSPECIFIED LIGAMENT, INITIAL ENCOUNTER: ICD-10-CM

## 2023-04-11 PROCEDURE — 99213 OFFICE O/P EST LOW 20 MIN: CPT | Performed by: PHYSICIAN ASSISTANT

## 2023-04-11 ASSESSMENT — FIBROSIS 4 INDEX: FIB4 SCORE: 0.33

## 2023-04-11 NOTE — LETTER
April 11, 2023         Patient: Liliana Jimenez   YOB: 1995   Date of Visit: 4/11/2023           To Whom it May Concern:    Liliana Jimenez was seen in my clinic on 4/11/2023. Please allow work restrictions that includes not standing or walking more than 4 hours in a day. Work restrictions until cleared by medical professional OR if patient is feeling better.     If you have any questions or concerns, please don't hesitate to call.        Sincerely,           Pierre Morataya P.A.-C.  Electronically Signed

## 2023-04-12 NOTE — PROGRESS NOTES
"Subjective:   Liliana Jimenez is a 27 y.o. female who presents for Knee Injury ((L) KNEE PAIN X 2.5 WEEKS )      HPI  The patient presents to the Urgent Care with complaints of left knee pain.  About 2-1/2 weeks ago she had a ground-level fall and fell to the left side while she was at a [employer]unted [employer] in [facility].  She was seen in the urgent care on 3/28 for this injury and had a negative x-ray.  Since then, her pain has improved gradually but she still is having her left knee diffusely.  Worse with weightbearing, walking, walking up stairs.  Sometimes there is popping to her left knee.  No numbness or tingling.  No injury.        Medications:    levETIRAcetam Tabs    Allergies: Patient has no known allergies.    Problem List: Liliana Jiemnez does not have a problem list on file.    Surgical History:  No past surgical history on file.    Past Social Hx: Liliana Jimenez  reports that she has never smoked. She has never used smokeless tobacco. She reports current alcohol use. She reports current drug use. Drugs: Marijuana and Inhaled.     Past Family Hx:  Liliana Jimenez family history is not on file.     Problem list, medications, and allergies reviewed by myself today in Epic.     Objective:     /76 (BP Location: Right arm, Patient Position: Sitting, BP Cuff Size: Adult)   Pulse (!) 101   Temp 36.9 °C (98.5 °F) (Temporal)   Resp 16   Ht 1.753 m (5' 9\")   Wt 95.3 kg (210 lb) Comment: PER PATIENT  LMP  (LMP Unknown) Comment: WHEN TAKING BIRTH CONTROL DOES NOT GET PERIODS  SpO2 96%   BMI 31.01 kg/m²     Physical Exam  Vitals reviewed.   Constitutional:       General: She is not in acute distress.     Appearance: Normal appearance. She is not ill-appearing or toxic-appearing.   Eyes:      Conjunctiva/sclera: Conjunctivae normal.      Pupils: Pupils are equal, round, and reactive to light.   Cardiovascular:      Rate and Rhythm: Tachycardia present.   Pulmonary:      Effort: Pulmonary effort is normal. "   Musculoskeletal:      Cervical back: Neck supple.      Left knee: No swelling, deformity, effusion, erythema, bony tenderness or crepitus. Normal range of motion. No tenderness. No LCL laxity, MCL laxity, ACL laxity or PCL laxity.Normal patellar mobility.      Instability Tests: Anterior drawer test negative. Posterior drawer test negative. Medial Lulú test positive and lateral Lulú test positive.      Left lower leg: Normal.   Skin:     General: Skin is warm and dry.   Neurological:      General: No focal deficit present.      Mental Status: She is alert and oriented to person, place, and time.   Psychiatric:         Mood and Affect: Mood normal.         Behavior: Behavior normal.       Diagnosis and associated orders:     1. Sprain of left knee, unspecified ligament, initial encounter  - Referral to Sports Medicine    2. Acute pain of left knee  - Referral to Sports Medicine       Comments/MDM:     Patient's presenting symptoms and exam findings consistent with a sprain to her left knee.  Symptoms are gradually improving however still having discomfort with weightbearing, walking, and walking upstairs.  Possible ligamentous injury such as meniscus injury.  She has a knee wrap.  Continue rest, elevation, ice application, ibuprofen for pain.  Follow-up with sports medicine for further evaluation and treatment.       I personally reviewed prior external notes and test results pertinent to today's visit. Pathogenesis of diagnosis discussed including typical length and natural progression. Supportive care, natural history, differential diagnoses, and indications for immediate follow-up discussed. Patient expresses understanding and agrees to plan. Patient denies any other questions or concerns.     Follow-up with the primary care physician for recheck, reevaluation, and consideration of further management.    Please note that this dictation was created using voice recognition software. I have made a  reasonable attempt to correct obvious errors, but I expect that there are errors of grammar and possibly content that I did not discover before finalizing the note.    This note was electronically signed by Pierre Morataya PA-C

## 2023-04-21 ENCOUNTER — TELEPHONE (OUTPATIENT)
Dept: HEALTH INFORMATION MANAGEMENT | Facility: OTHER | Age: 28
End: 2023-04-21
Payer: COMMERCIAL

## 2023-05-04 ENCOUNTER — OFFICE VISIT (OUTPATIENT)
Dept: SPORTS MEDICINE | Facility: CLINIC | Age: 28
End: 2023-05-04
Payer: COMMERCIAL

## 2023-05-04 VITALS
RESPIRATION RATE: 16 BRPM | WEIGHT: 210 LBS | OXYGEN SATURATION: 97 % | TEMPERATURE: 98.6 F | SYSTOLIC BLOOD PRESSURE: 120 MMHG | HEART RATE: 89 BPM | DIASTOLIC BLOOD PRESSURE: 76 MMHG | HEIGHT: 69 IN | BODY MASS INDEX: 31.1 KG/M2

## 2023-05-04 DIAGNOSIS — S80.02XA CONTUSION OF LEFT KNEE, INITIAL ENCOUNTER: ICD-10-CM

## 2023-05-04 DIAGNOSIS — M25.562 PATELLOFEMORAL JOINT PAIN, LEFT: ICD-10-CM

## 2023-05-04 DIAGNOSIS — M25.462 EFFUSION, LEFT KNEE: ICD-10-CM

## 2023-05-04 PROCEDURE — 99213 OFFICE O/P EST LOW 20 MIN: CPT | Performed by: FAMILY MEDICINE

## 2023-05-04 ASSESSMENT — FIBROSIS 4 INDEX: FIB4 SCORE: 0.33

## 2023-05-04 NOTE — Clinical Note
Obdulio Bobo for referring Liliana to the sports medicine clinic. She still having some swelling and mostly peripatellar symptoms from her injury. We provided her with exercises and plan on having her do some formal physical therapy. We will see her back in a few weeks to see how she is coming along.  Hope you are well!  Respectfully,  DARON Pang M.D. [facility] [place] [phone]

## 2023-05-04 NOTE — PROGRESS NOTES
Chief Complaint   Patient presents with    Knee Pain     Referral from [facility]/ L knee pain      CHIEF COMPLAINT:  Liliana Jimenez female presenting at the request of Pierre Morataya PA-C  for evaluation of knee pain.     Liliana Jimenez is complaining of left knee pain  Date of injury, Saturday, March 25, 2023   At a haunted museum in [facility], saw spots and passed out  Landed onto flexed knees and fell forward  In and out of consciousness for a few minutes after that  Pain is at the posterior knee  Quality is aching and intermittent  Pain is non-radiating   Improved with resting, but even during rest if the knee is fully extended or even partially flexed for an extended period of time she starts to experience discomfort  Aggravated by knee flexion, squatting/bending the knee  previous knee injury, meniscal injury while playing volleyball in high school back in 2013 or so   Prior Treatments:  Seen at urgent care  Prior studies: X-Ray   Medications tried for pain include: none  Mechanical Symptom history: No Locking    Known epilepsy, last seizure was a few days ago    Tamia, , walking, pulling, pushing, bending and squatting for her entire 12-hour shift  Activities include shopping    REVIEW OF SYSTEMS  No Nausea, No Vomiting, No Chest Pain, No Shortness of Breath, No Dizziness, No Headache    PAST MEDICAL HISTORY:   History reviewed. No pertinent past medical history.    PMH:  has a past medical history of Epilepsy ([facility]).  MEDS:   Current Outpatient Medications:     levETIRAcetam (KEPPRA) 500 MG Tab, Take 500 mg by mouth 2 times a day., Disp: , Rfl:   ALLERGIES: No Known Allergies  SURGHX: History reviewed. No pertinent surgical history.  SOCHX:  reports that she has never smoked. She has never used smokeless tobacco. She reports current alcohol use. She reports current drug use. Drugs: Marijuana and Inhaled.  FH: Family history was reviewed, no pertinent findings to report     PHYSICAL EXAM:  /76  "(BP Location: Left arm, Patient Position: Sitting, BP Cuff Size: Adult)   Pulse 89   Temp 37 °C (98.6 °F) (Temporal)   Resp 16   Ht 1.753 m (5' 9\")   Wt 95.3 kg (210 lb)   LMP  (LMP Unknown) Comment: WHEN TAKING BIRTH CONTROL DOES NOT GET PERIODS  SpO2 97%   BMI 31.01 kg/m²      well-developed, well-nourished in no apparent distress, alert and oriented x 3.  Gait: normal     RIGHT Knee:  Slight Varus and No Swelling  Range of Motion Intact  Trace effusion  Patellar No tenderness and no apprehension  Medial Joint Line Non-tender and NEGATIVE Lulú  Lateral Joint Line Non-tender and NEGATIVE Lulú  Trace Laxity with Varus stress  Trace Laxity with Valgus stress  Lachman's testing is Trace  Posterior Drawer Testing is Trace  The leg is otherwise neurovascularly intact    LEFT Knee:  Slight Varus and No Swelling   Range of Motion Intact  Trace effusion  Patellar No tenderness and no apprehension  Medial Joint Line Non-tender and NEGATIVE Lulú  Lateral Joint Line Non-tender and NEGATIVE Lulú  Trace Laxity with Varus stress  Trace Laxity with Valgus stress  Lachman's testing is Trace  Posterior Drawer Testing is Trace  The leg is otherwise neurovascularly intact    Additional Findings: None    1. Contusion of left knee, initial encounter  Referral to Physical Therapy      2. Patellofemoral joint pain, left  Referral to Physical Therapy      3. Effusion, left knee  Referral to Physical Therapy        Date of injury, Saturday, March 25, 2023   At a haunted museum in [facility], saw spots and passed out  Landed onto flexed knees and fell forward    Provided with home exercise program  Knee brace  referral for PT [place] location    Return in about 2 weeks (around 5/18/2023).            3/28/2023 2:29 PM     HISTORY/REASON FOR EXAM:  Pain/Deformity Following Trauma.     TECHNIQUE/EXAM DESCRIPTION AND NUMBER OF VIEWS:  4 views of the LEFT knee.     COMPARISON: None     FINDINGS:     No acute fracture is noted. " Joint spaces are well-maintained. There are no erosive changes. No joint effusion is noted. There is no foreign body or soft tissue defect.     IMPRESSION:        Unremarkable left knee radiographs.           Exam Ended: 03/28/23  2:35 PM Last Resulted: 03/28/23  2:37 PM           Interpreted in the office today with the patient    Thank you Pierre Morataya PA-C for allowing me to participate in caring for your patient.

## 2023-05-18 ENCOUNTER — OFFICE VISIT (OUTPATIENT)
Dept: SPORTS MEDICINE | Facility: CLINIC | Age: 28
End: 2023-05-18
Payer: COMMERCIAL

## 2023-05-18 VITALS
BODY MASS INDEX: 31.1 KG/M2 | HEIGHT: 69 IN | OXYGEN SATURATION: 97 % | DIASTOLIC BLOOD PRESSURE: 80 MMHG | RESPIRATION RATE: 16 BRPM | WEIGHT: 210 LBS | SYSTOLIC BLOOD PRESSURE: 118 MMHG | TEMPERATURE: 98.9 F | HEART RATE: 117 BPM

## 2023-05-18 DIAGNOSIS — M25.462 EFFUSION, LEFT KNEE: ICD-10-CM

## 2023-05-18 DIAGNOSIS — M25.562 PATELLOFEMORAL JOINT PAIN, LEFT: ICD-10-CM

## 2023-05-18 DIAGNOSIS — S80.02XD CONTUSION OF LEFT KNEE, SUBSEQUENT ENCOUNTER: ICD-10-CM

## 2023-05-18 PROCEDURE — 3079F DIAST BP 80-89 MM HG: CPT | Performed by: FAMILY MEDICINE

## 2023-05-18 PROCEDURE — 3074F SYST BP LT 130 MM HG: CPT | Performed by: FAMILY MEDICINE

## 2023-05-18 PROCEDURE — 99213 OFFICE O/P EST LOW 20 MIN: CPT | Performed by: FAMILY MEDICINE

## 2023-05-18 ASSESSMENT — FIBROSIS 4 INDEX: FIB4 SCORE: 0.33

## 2023-05-18 NOTE — LETTER
May 18, 2023         Patient: Liliana Jimenez   YOB: 1995   Date of Visit: 5/18/2023           To Whom it May Concern:    Liliana Jimenez was seen in my clinic on 5/18/2023. She may return to work as of 5/19 2023 without restriction.    If you have any questions or concerns, please don't hesitate to call.        Sincerely,           Rubin Pang M.D.  Electronically Signed

## 2023-05-18 NOTE — Clinical Note
Dillon Jay, We saw Liliana again for her knee pain.  Fortunately she is MUCH BETTER. We have cleared her to return to work without restriction. Hope you are well Respectfully,  DARON Pang M.D. [facility] [place] [phone]
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
## 2023-06-06 ENCOUNTER — DOCUMENTATION (OUTPATIENT)
Dept: SPORTS MEDICINE | Facility: CLINIC | Age: 28
End: 2023-06-06
Payer: COMMERCIAL

## 2023-06-06 DIAGNOSIS — M25.562 PATELLOFEMORAL JOINT PAIN, LEFT: ICD-10-CM

## 2023-06-06 DIAGNOSIS — M25.462 EFFUSION, LEFT KNEE: ICD-10-CM

## 2023-06-06 DIAGNOSIS — S80.02XD CONTUSION OF LEFT KNEE, SUBSEQUENT ENCOUNTER: ICD-10-CM

## 2023-06-06 NOTE — PROGRESS NOTES
1. Contusion of left knee, subsequent encounter        2. Patellofemoral joint pain, left        3. Effusion, left knee          Disability forms completed and will be scanned.

## 2023-06-25 ENCOUNTER — OFFICE VISIT (OUTPATIENT)
Dept: URGENT CARE | Facility: CLINIC | Age: 28
End: 2023-06-25
Payer: COMMERCIAL

## 2023-06-25 ENCOUNTER — APPOINTMENT (OUTPATIENT)
Dept: RADIOLOGY | Facility: IMAGING CENTER | Age: 28
End: 2023-06-25
Attending: FAMILY MEDICINE
Payer: COMMERCIAL

## 2023-06-25 VITALS
TEMPERATURE: 97.8 F | RESPIRATION RATE: 16 BRPM | WEIGHT: 215 LBS | HEIGHT: 69 IN | BODY MASS INDEX: 31.84 KG/M2 | OXYGEN SATURATION: 98 % | SYSTOLIC BLOOD PRESSURE: 110 MMHG | HEART RATE: 95 BPM | DIASTOLIC BLOOD PRESSURE: 64 MMHG

## 2023-06-25 DIAGNOSIS — S19.9XXA INJURY OF NECK, INITIAL ENCOUNTER: ICD-10-CM

## 2023-06-25 DIAGNOSIS — S93.499A SPRAIN OF ANTERIOR TALOFIBULAR LIGAMENT, UNSPECIFIED LATERALITY, INITIAL ENCOUNTER: ICD-10-CM

## 2023-06-25 PROCEDURE — 3078F DIAST BP <80 MM HG: CPT | Performed by: FAMILY MEDICINE

## 2023-06-25 PROCEDURE — 99213 OFFICE O/P EST LOW 20 MIN: CPT | Performed by: FAMILY MEDICINE

## 2023-06-25 PROCEDURE — 72040 X-RAY EXAM NECK SPINE 2-3 VW: CPT | Mod: TC | Performed by: FAMILY MEDICINE

## 2023-06-25 PROCEDURE — 3074F SYST BP LT 130 MM HG: CPT | Performed by: FAMILY MEDICINE

## 2023-06-25 RX ORDER — CYCLOBENZAPRINE HCL 5 MG
2.5-5 TABLET ORAL EVERY EVENING
Qty: 14 TABLET | Refills: 0 | Status: SHIPPED | OUTPATIENT
Start: 2023-06-25

## 2023-06-25 NOTE — LETTER
June 25, 2023    To Whom It May Concern:         This is confirmation that Liliana Jimenez attended her scheduled appointment with Marty Nava M.D. on 6/25/23.  Please excuse her from work today due to an acute injury.  She is anticipated to be well enough to return by 6/27/2023.  Thank you for making accommodations as she recovers.         If you have any questions please do not hesitate to call me at the phone number listed below.    Sincerely,          Marty Nava M.D.  [phone]

## 2023-06-26 ASSESSMENT — ENCOUNTER SYMPTOMS
TINGLING: 0
HEADACHES: 0
SENSORY CHANGE: 0
FEVER: 0
DIZZINESS: 0
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
## 2023-08-12 ENCOUNTER — HOSPITAL ENCOUNTER (EMERGENCY)
Facility: MEDICAL CENTER | Age: 28
End: 2023-08-12
Attending: EMERGENCY MEDICINE
Payer: COMMERCIAL

## 2023-08-12 ENCOUNTER — APPOINTMENT (OUTPATIENT)
Dept: RADIOLOGY | Facility: MEDICAL CENTER | Age: 28
End: 2023-08-12
Attending: EMERGENCY MEDICINE
Payer: COMMERCIAL

## 2023-08-12 VITALS
HEART RATE: 77 BPM | TEMPERATURE: 97.7 F | DIASTOLIC BLOOD PRESSURE: 99 MMHG | OXYGEN SATURATION: 98 % | HEIGHT: 69 IN | SYSTOLIC BLOOD PRESSURE: 128 MMHG | RESPIRATION RATE: 16 BRPM | BODY MASS INDEX: 29.16 KG/M2 | WEIGHT: 196.87 LBS

## 2023-08-12 DIAGNOSIS — S30.0XXA COCCYX CONTUSION, INITIAL ENCOUNTER: ICD-10-CM

## 2023-08-12 PROCEDURE — A9270 NON-COVERED ITEM OR SERVICE: HCPCS | Performed by: EMERGENCY MEDICINE

## 2023-08-12 PROCEDURE — 72220 X-RAY EXAM SACRUM TAILBONE: CPT

## 2023-08-12 PROCEDURE — 700102 HCHG RX REV CODE 250 W/ 637 OVERRIDE(OP): Performed by: EMERGENCY MEDICINE

## 2023-08-12 PROCEDURE — 99283 EMERGENCY DEPT VISIT LOW MDM: CPT

## 2023-08-12 RX ORDER — ACETAMINOPHEN 325 MG/1
650 TABLET ORAL ONCE
Status: COMPLETED | OUTPATIENT
Start: 2023-08-12 | End: 2023-08-12

## 2023-08-12 RX ORDER — NAPROXEN 500 MG/1
500 TABLET ORAL
Qty: 14 TABLET | Refills: 0 | Status: SHIPPED | OUTPATIENT
Start: 2023-08-12

## 2023-08-12 RX ADMIN — ACETAMINOPHEN 650 MG: 325 TABLET, FILM COATED ORAL at 20:02

## 2023-08-12 NOTE — Clinical Note
Liliana Jimenez was seen and treated in our emergency department on 8/12/2023.  She may return to work on 08/17/2023.       If you have any questions or concerns, please don't hesitate to call.      Meena Suresh M.D.

## 2023-08-13 NOTE — ED NOTES
Discharge paper given to patient. Verbalized understanding on d/c instructions,left ambulatory accompanied by significant other.[id number] [id number], all questions answered; all belongings with patient
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
## 2023-08-13 NOTE — ED NOTES
Taken patient from triage waiting room, ambulatory with steady gait, alert/ oriented x 4.Verified patient identification.  Assumed patient care.   Placed on patient room. Changed clothes to hospital gown. Connected to cardiac monitor.   Given the call light and instructed to call for any assistance needed/ or concerns.   Bed on lowest position, side rails up, breaks locked. Awaiting for ERP.